# Patient Record
Sex: MALE | Race: WHITE | NOT HISPANIC OR LATINO | Employment: OTHER | URBAN - NONMETROPOLITAN AREA
[De-identification: names, ages, dates, MRNs, and addresses within clinical notes are randomized per-mention and may not be internally consistent; named-entity substitution may affect disease eponyms.]

---

## 2019-10-07 ENCOUNTER — TRANSCRIBE ORDERS (OUTPATIENT)
Dept: ADMINISTRATIVE | Facility: HOSPITAL | Age: 54
End: 2019-10-07

## 2019-10-07 DIAGNOSIS — C34.91 PRIMARY LUNG CANCER, RIGHT (HCC): ICD-10-CM

## 2019-10-07 DIAGNOSIS — R91.1 LUNG NODULE: Primary | ICD-10-CM

## 2019-10-07 DIAGNOSIS — J44.9 CHRONIC OBSTRUCTIVE PULMONARY DISEASE, UNSPECIFIED COPD TYPE (HCC): ICD-10-CM

## 2019-10-11 ENCOUNTER — HOSPITAL ENCOUNTER (OUTPATIENT)
Dept: PET IMAGING | Facility: HOSPITAL | Age: 54
Discharge: HOME | End: 2019-10-11

## 2019-10-11 DIAGNOSIS — R91.1 LUNG NODULE: ICD-10-CM

## 2019-10-17 ENCOUNTER — TRANSCRIBE ORDERS (OUTPATIENT)
Dept: ADMINISTRATIVE | Facility: HOSPITAL | Age: 54
End: 2019-10-17

## 2019-10-17 DIAGNOSIS — C34.11 MALIGNANT NEOPLASM OF UPPER LOBE OF RIGHT LUNG (HCC): Primary | ICD-10-CM

## 2019-10-18 ENCOUNTER — HOSPITAL ENCOUNTER (OUTPATIENT)
Dept: PET IMAGING | Facility: HOSPITAL | Age: 54
Discharge: HOME | End: 2019-10-18
Admitting: INTERNAL MEDICINE

## 2019-10-18 PROCEDURE — 78815 PET IMAGE W/CT SKULL-THIGH: CPT

## 2019-10-18 PROCEDURE — A9552 F18 FDG: HCPCS | Performed by: INTERNAL MEDICINE

## 2019-10-18 PROCEDURE — 78815 PET IMAGE W/CT SKULL-THIGH: CPT | Performed by: RADIOLOGY

## 2019-10-18 PROCEDURE — 0 FLUDEOXYGLUCOSE F18 SOLUTION: Performed by: INTERNAL MEDICINE

## 2019-10-18 RX ADMIN — FLUDEOXYGLUCOSE F18 1 DOSE: 300 INJECTION INTRAVENOUS at 10:46

## 2019-10-22 ENCOUNTER — APPOINTMENT (OUTPATIENT)
Dept: CT IMAGING | Facility: HOSPITAL | Age: 54
End: 2019-10-22

## 2022-05-02 ENCOUNTER — OFFICE VISIT (OUTPATIENT)
Dept: SURGERY | Facility: CLINIC | Age: 57
End: 2022-05-02

## 2022-05-02 VITALS
OXYGEN SATURATION: 98 % | BODY MASS INDEX: 26.07 KG/M2 | WEIGHT: 172 LBS | TEMPERATURE: 98.2 F | RESPIRATION RATE: 18 BRPM | DIASTOLIC BLOOD PRESSURE: 80 MMHG | HEART RATE: 82 BPM | SYSTOLIC BLOOD PRESSURE: 144 MMHG | HEIGHT: 68 IN

## 2022-05-02 DIAGNOSIS — K80.20 CALCULUS OF GALLBLADDER WITHOUT CHOLECYSTITIS WITHOUT OBSTRUCTION: Primary | ICD-10-CM

## 2022-05-02 PROCEDURE — 99204 OFFICE O/P NEW MOD 45 MIN: CPT | Performed by: SURGERY

## 2022-05-02 RX ORDER — CLONIDINE HYDROCHLORIDE 0.3 MG/1
0.3 TABLET ORAL EVERY 8 HOURS
COMMUNITY
Start: 2022-02-03

## 2022-05-02 RX ORDER — PAROXETINE HYDROCHLORIDE 20 MG/1
20 TABLET, FILM COATED ORAL EVERY MORNING
COMMUNITY
Start: 2022-04-02

## 2022-05-02 RX ORDER — AMLODIPINE BESYLATE 10 MG/1
10 TABLET ORAL DAILY
COMMUNITY

## 2022-05-02 RX ORDER — INSULIN LISPRO 100 [IU]/ML
INJECTION, SOLUTION INTRAVENOUS; SUBCUTANEOUS AS NEEDED
COMMUNITY
Start: 2022-02-03

## 2022-05-02 RX ORDER — LISINOPRIL 40 MG/1
40 TABLET ORAL DAILY
COMMUNITY

## 2022-05-02 RX ORDER — GABAPENTIN 800 MG/1
800 TABLET ORAL 3 TIMES DAILY
COMMUNITY

## 2022-05-02 RX ORDER — SODIUM CHLORIDE 9 MG/ML
100 INJECTION, SOLUTION INTRAVENOUS CONTINUOUS
Status: CANCELLED | OUTPATIENT
Start: 2022-05-02

## 2022-05-02 RX ORDER — MONTELUKAST SODIUM 10 MG/1
10 TABLET ORAL EVERY EVENING
COMMUNITY
Start: 2022-02-03

## 2022-05-02 RX ORDER — ARIPIPRAZOLE 5 MG/1
TABLET ORAL
COMMUNITY

## 2022-05-02 NOTE — PROGRESS NOTES
Patient: Du Mcknight    YOB: 1965    Date: 05/02/2022    Primary Care Provider: Isaac Addison MD    Chief Complaint   Patient presents with   • Cholelithiasis     Right upper quadrant pain       SUBJECTIVE:    History of present illness: Patient with a several month history of right upper quadrant pain.  This oftentimes occurs after eating but not necessarily.  Associated with nausea but no vomiting.  Mild to moderate in nature.    The following portions of the patient's history were reviewed and updated as appropriate: allergies, current medications, past family history, past medical history, past social history, past surgical history and problem list.    Review of Systems   Constitutional: Positive for appetite change. Negative for chills, fatigue, fever and unexpected weight change.   HENT: Negative for congestion, nosebleeds, postnasal drip, trouble swallowing and voice change.    Eyes: Negative for photophobia and visual disturbance.   Respiratory: Negative for apnea, cough, choking, chest tightness, shortness of breath and wheezing.    Cardiovascular: Negative for chest pain, palpitations and leg swelling.   Gastrointestinal: Positive for abdominal pain (right upper quadrant / epigastric pain) and nausea. Negative for abdominal distention, anal bleeding, blood in stool, constipation, diarrhea, rectal pain and vomiting.   Endocrine: Negative for cold intolerance and heat intolerance.   Genitourinary: Negative for difficulty urinating, dysuria, flank pain, frequency, scrotal swelling and testicular pain.   Musculoskeletal: Negative for arthralgias, back pain, gait problem and joint swelling.   Skin: Negative for color change, rash and wound.   Neurological: Negative for dizziness, seizures, syncope, speech difficulty, weakness, light-headedness, numbness and headaches.   Hematological: Negative for adenopathy. Does not bruise/bleed easily.   Psychiatric/Behavioral: Negative for  "agitation, behavioral problems, confusion and hallucinations. The patient is not nervous/anxious.        History:  Past Medical History:   Diagnosis Date   • Cancer (HCC)    • Diabetes mellitus (HCC)    • Hypertension        Past Surgical History:   Procedure Laterality Date   • BRAIN SURGERY N/A    • HIP SURGERY     • LUNG CANCER SURGERY         History reviewed. No pertinent family history.    Social History     Tobacco Use   • Smoking status: Current Every Day Smoker   • Smokeless tobacco: Never Used   Substance Use Topics   • Alcohol use: Defer   • Drug use: Defer       Allergies:  Allergies   Allergen Reactions   • Neosporin Original [Bacitracin-Neomycin-Polymyxin] Hives       Medications:    Current Outpatient Medications:   •  amLODIPine (NORVASC) 10 MG tablet, amlodipine 10 mg tablet, Disp: , Rfl:   •  ARIPiprazole (ABILIFY) 5 MG tablet, aripiprazole 5 mg tablet, Disp: , Rfl:   •  cloNIDine (CATAPRES) 0.3 MG tablet, Take 0.3 mg by mouth Every 8 (Eight) Hours., Disp: , Rfl:   •  gabapentin (NEURONTIN) 800 MG tablet, gabapentin 800 mg tablet, Disp: , Rfl:   •  HumaLOG KwikPen 100 UNIT/ML solution pen-injector, , Disp: , Rfl:   •  lisinopril (PRINIVIL,ZESTRIL) 40 MG tablet, lisinopril 40 mg tablet, Disp: , Rfl:   •  montelukast (SINGULAIR) 10 MG tablet, Take 10 mg by mouth Every Evening., Disp: , Rfl:   •  PARoxetine (PAXIL) 20 MG tablet, Take 20 mg by mouth Every Morning., Disp: , Rfl:     OBJECTIVE:    Vital Signs:   Vitals:    05/02/22 1315   BP: 144/80   Pulse: 82   Resp: 18   Temp: 98.2 °F (36.8 °C)   TempSrc: Temporal   SpO2: 98%   Weight: 78 kg (172 lb)   Height: 172.7 cm (68\")       Physical Exam:   General Appearance:    Alert, cooperative, in no acute distress   Head:    Normocephalic, without obvious abnormality, atraumatic   Eyes:            Normal.  No scleral icterus.  PERRLA    Lungs:     Clear to auscultation,respirations regular, even and                  unlabored    Heart:    Regular rhythm " and normal rate, normal S1 and S2, no            murmur   Abdomen:     Normal bowel sounds, no masses, no organomegaly, soft        non-tender, non-distended, no guarding,    Extremities:   Moves all extremities well, no edema, no cyanosis, no             redness   Skin:   No bleeding, bruising or rash   Neurologic:   Normal without gross deficits.   Psychiatric: No evidence of depression or anxiety        Results Review:   I reviewed the patient's new clinical results.  Labs and ultrasound and CT were reviewed    Review of Systems was reviewed and confirmed as accurate as documented by the MA.    ASSESSMENT/PLAN:    1. Calculus of gallbladder without cholecystitis without obstruction      Patient with probable symptomatic cholelithiasis.  I discussed this diagnosis with him.  I explained to him that he has both the option of observation with low-fat diet versus a cholecystectomy.  He understands the risks of observation which include but not limited to cholecystitis, choledocholithiasis, pancreatitis etc.   I also explained this procedure to them in detail and they understand the procedure.  They also understand the risks of bleeding, infection, bile leak, ductal injury, bowel injury, the possibility of converting to an open procedure etc. They understand all of these risks and I have answered all of their questions and they wish to proceed with a laparoscopic cholecystectomy.    Electronically signed by Eitan Webster MD  05/02/22

## 2022-05-04 PROBLEM — K80.20 CALCULUS OF GALLBLADDER WITHOUT CHOLECYSTITIS WITHOUT OBSTRUCTION: Status: ACTIVE | Noted: 2022-05-04

## 2022-05-12 ENCOUNTER — TELEPHONE (OUTPATIENT)
Dept: PREADMISSION TESTING | Facility: HOSPITAL | Age: 57
End: 2022-05-12

## 2022-05-12 NOTE — TELEPHONE ENCOUNTER
BRETM FOR PATIENT TO CALL AND CONFIRM  HIS PROCEDURE FOR 05/17/2022 @ [facility]..NOTED IN  VM THAT HE HAS PAT ON 05/13/2022 @ 11:00 AM W/R.

## 2022-05-13 ENCOUNTER — PRE-ADMISSION TESTING (OUTPATIENT)
Dept: PREADMISSION TESTING | Facility: HOSPITAL | Age: 57
End: 2022-05-13

## 2022-05-13 VITALS — WEIGHT: 171.8 LBS | BODY MASS INDEX: 26.04 KG/M2 | HEIGHT: 68 IN

## 2022-05-13 LAB
ANION GAP SERPL CALCULATED.3IONS-SCNC: 13.3 MMOL/L (ref 5–15)
BUN SERPL-MCNC: 28 MG/DL (ref 6–20)
BUN/CREAT SERPL: 13.9 (ref 7–25)
CALCIUM SPEC-SCNC: 10 MG/DL (ref 8.6–10.5)
CHLORIDE SERPL-SCNC: 100 MMOL/L (ref 98–107)
CO2 SERPL-SCNC: 23.7 MMOL/L (ref 22–29)
CREAT SERPL-MCNC: 2.01 MG/DL (ref 0.76–1.27)
DEPRECATED RDW RBC AUTO: 39.4 FL (ref 37–54)
EGFRCR SERPLBLD CKD-EPI 2021: 38.2 ML/MIN/1.73
ERYTHROCYTE [DISTWIDTH] IN BLOOD BY AUTOMATED COUNT: 11.6 % (ref 12.3–15.4)
GLUCOSE SERPL-MCNC: 182 MG/DL (ref 65–99)
HCT VFR BLD AUTO: 38.6 % (ref 37.5–51)
HGB BLD-MCNC: 13.4 G/DL (ref 13–17.7)
MCH RBC QN AUTO: 32.2 PG (ref 26.6–33)
MCHC RBC AUTO-ENTMCNC: 34.7 G/DL (ref 31.5–35.7)
MCV RBC AUTO: 92.8 FL (ref 79–97)
PLATELET # BLD AUTO: 285 10*3/MM3 (ref 140–450)
PMV BLD AUTO: 9.6 FL (ref 6–12)
POTASSIUM SERPL-SCNC: 5.2 MMOL/L (ref 3.5–5.2)
RBC # BLD AUTO: 4.16 10*6/MM3 (ref 4.14–5.8)
SARS-COV-2 RNA PNL SPEC NAA+PROBE: NOT DETECTED
SODIUM SERPL-SCNC: 137 MMOL/L (ref 136–145)
WBC NRBC COR # BLD: 10.22 10*3/MM3 (ref 3.4–10.8)

## 2022-05-13 PROCEDURE — 93010 ELECTROCARDIOGRAM REPORT: CPT | Performed by: INTERNAL MEDICINE

## 2022-05-13 PROCEDURE — 36415 COLL VENOUS BLD VENIPUNCTURE: CPT

## 2022-05-13 PROCEDURE — C9803 HOPD COVID-19 SPEC COLLECT: HCPCS

## 2022-05-13 PROCEDURE — 85027 COMPLETE CBC AUTOMATED: CPT

## 2022-05-13 PROCEDURE — 93005 ELECTROCARDIOGRAM TRACING: CPT

## 2022-05-13 PROCEDURE — U0005 INFEC AGEN DETEC AMPLI PROBE: HCPCS

## 2022-05-13 PROCEDURE — U0004 COV-19 TEST NON-CDC HGH THRU: HCPCS

## 2022-05-13 PROCEDURE — 80048 BASIC METABOLIC PNL TOTAL CA: CPT

## 2022-05-13 RX ORDER — TAMSULOSIN HYDROCHLORIDE 0.4 MG/1
1 CAPSULE ORAL DAILY
COMMUNITY

## 2022-05-13 RX ORDER — SEMAGLUTIDE 1.34 MG/ML
INJECTION, SOLUTION SUBCUTANEOUS WEEKLY
COMMUNITY

## 2022-05-13 NOTE — PAT
Patient here for PAT appointment today.  Preliminary EKG shows NSR, low voltage QRS, cannot rule out anterior infarct, age undetermined, T wave abnormality, consider inferior ischemia, abnormal ECG.  No history of CP and no history of seeing a cardiologist.  No previous EKG on file.  This information relayed to LUIS DANIEL De.  [place] states nothing further needed for patient.

## 2022-05-14 LAB
QT INTERVAL: 370 MS
QTC INTERVAL: 437 MS

## 2022-05-16 ENCOUNTER — TELEPHONE (OUTPATIENT)
Dept: SURGERY | Facility: CLINIC | Age: 57
End: 2022-05-16

## 2022-05-16 NOTE — TELEPHONE ENCOUNTER
Patient contacted regarding labs, advised that he is going to see a nephrologist next month. Stated that he was aware of the concern. All questions have been answered.

## 2022-05-16 NOTE — TELEPHONE ENCOUNTER
----- Message from Eitan Webster MD sent at 5/13/2022 12:50 PM EDT -----  Is he aware that he has some minor kidney issues.  Also seen about 3 months ago.  If not he needs to see his primary care for further evaluation for this.  ----- Message -----  From: Lab, Background User  Sent: 5/13/2022  12:23 PM EDT  To: Eitan Webster MD

## 2022-05-17 ENCOUNTER — ANESTHESIA (OUTPATIENT)
Dept: PERIOP | Facility: HOSPITAL | Age: 57
End: 2022-05-17

## 2022-05-17 ENCOUNTER — APPOINTMENT (OUTPATIENT)
Dept: GENERAL RADIOLOGY | Facility: HOSPITAL | Age: 57
End: 2022-05-17

## 2022-05-17 ENCOUNTER — ANESTHESIA EVENT (OUTPATIENT)
Dept: PERIOP | Facility: HOSPITAL | Age: 57
End: 2022-05-17

## 2022-05-17 ENCOUNTER — HOSPITAL ENCOUNTER (OUTPATIENT)
Facility: HOSPITAL | Age: 57
Setting detail: HOSPITAL OUTPATIENT SURGERY
Discharge: HOME | End: 2022-05-17
Attending: SURGERY | Admitting: SURGERY

## 2022-05-17 VITALS
DIASTOLIC BLOOD PRESSURE: 96 MMHG | OXYGEN SATURATION: 93 % | HEART RATE: 75 BPM | SYSTOLIC BLOOD PRESSURE: 162 MMHG | TEMPERATURE: 97.5 F | RESPIRATION RATE: 20 BRPM

## 2022-05-17 DIAGNOSIS — K80.20 CALCULUS OF GALLBLADDER WITHOUT CHOLECYSTITIS WITHOUT OBSTRUCTION: ICD-10-CM

## 2022-05-17 LAB — GLUCOSE BLDC GLUCOMTR-MCNC: 149 MG/DL (ref 70–130)

## 2022-05-17 PROCEDURE — 82962 GLUCOSE BLOOD TEST: CPT

## 2022-05-17 PROCEDURE — 0 AMPICILLIN-SULBACTAM PER 1.5 G

## 2022-05-17 PROCEDURE — 25010000002 DEXAMETHASONE PER 1 MG: Performed by: NURSE ANESTHETIST, CERTIFIED REGISTERED

## 2022-05-17 PROCEDURE — 25010000002 ONDANSETRON PER 1 MG: Performed by: NURSE ANESTHETIST, CERTIFIED REGISTERED

## 2022-05-17 PROCEDURE — 25010000002 FENTANYL CITRATE (PF) 100 MCG/2ML SOLUTION: Performed by: NURSE ANESTHETIST, CERTIFIED REGISTERED

## 2022-05-17 PROCEDURE — 25010000002 PROPOFOL 200 MG/20ML EMULSION: Performed by: NURSE ANESTHETIST, CERTIFIED REGISTERED

## 2022-05-17 PROCEDURE — 25010000002 HYDROMORPHONE 1 MG/ML SOLUTION: Performed by: NURSE ANESTHETIST, CERTIFIED REGISTERED

## 2022-05-17 PROCEDURE — 47563 LAPARO CHOLECYSTECTOMY/GRAPH: CPT | Performed by: SURGERY

## 2022-05-17 PROCEDURE — 88304 TISSUE EXAM BY PATHOLOGIST: CPT

## 2022-05-17 PROCEDURE — 25010000002 IOPAMIDOL 61 % SOLUTION: Performed by: SURGERY

## 2022-05-17 PROCEDURE — 74300 X-RAY BILE DUCTS/PANCREAS: CPT

## 2022-05-17 DEVICE — LIGAMAX 5 MM ENDOSCOPIC MULTIPLE CLIP APPLIER
Type: IMPLANTABLE DEVICE | Site: ABDOMEN | Status: FUNCTIONAL
Brand: LIGAMAX

## 2022-05-17 RX ORDER — SODIUM CHLORIDE 9 MG/ML
100 INJECTION, SOLUTION INTRAVENOUS CONTINUOUS
Status: DISCONTINUED | OUTPATIENT
Start: 2022-05-17 | End: 2022-05-17 | Stop reason: HOSPADM

## 2022-05-17 RX ORDER — IPRATROPIUM BROMIDE AND ALBUTEROL SULFATE 2.5; .5 MG/3ML; MG/3ML
3 SOLUTION RESPIRATORY (INHALATION) ONCE AS NEEDED
Status: DISCONTINUED | OUTPATIENT
Start: 2022-05-17 | End: 2022-05-17 | Stop reason: HOSPADM

## 2022-05-17 RX ORDER — LABETALOL HYDROCHLORIDE 5 MG/ML
10 INJECTION, SOLUTION INTRAVENOUS ONCE
Status: COMPLETED | OUTPATIENT
Start: 2022-05-17 | End: 2022-05-17

## 2022-05-17 RX ORDER — ROCURONIUM BROMIDE 10 MG/ML
INJECTION, SOLUTION INTRAVENOUS AS NEEDED
Status: DISCONTINUED | OUTPATIENT
Start: 2022-05-17 | End: 2022-05-17 | Stop reason: SURG

## 2022-05-17 RX ORDER — ONDANSETRON 4 MG/1
4 TABLET, FILM COATED ORAL EVERY 8 HOURS PRN
Qty: 8 TABLET | Refills: 1 | Status: SHIPPED | OUTPATIENT
Start: 2022-05-17

## 2022-05-17 RX ORDER — HYDROCODONE BITARTRATE AND ACETAMINOPHEN 5; 325 MG/1; MG/1
1-2 TABLET ORAL EVERY 4 HOURS PRN
Qty: 10 TABLET | Refills: 0 | Status: SHIPPED | OUTPATIENT
Start: 2022-05-17 | End: 2022-05-17 | Stop reason: SDUPTHER

## 2022-05-17 RX ORDER — ONDANSETRON 2 MG/ML
INJECTION INTRAMUSCULAR; INTRAVENOUS AS NEEDED
Status: DISCONTINUED | OUTPATIENT
Start: 2022-05-17 | End: 2022-05-17 | Stop reason: SURG

## 2022-05-17 RX ORDER — HYDROCODONE BITARTRATE AND ACETAMINOPHEN 5; 325 MG/1; MG/1
1-2 TABLET ORAL EVERY 4 HOURS PRN
Qty: 10 TABLET | Refills: 0 | Status: SHIPPED | OUTPATIENT
Start: 2022-05-17 | End: 2022-05-17 | Stop reason: HOSPADM

## 2022-05-17 RX ORDER — DEXTROSE MONOHYDRATE 25 G/50ML
25 INJECTION, SOLUTION INTRAVENOUS ONCE
Status: CANCELLED | OUTPATIENT
Start: 2022-05-17

## 2022-05-17 RX ORDER — FENTANYL CITRATE 50 UG/ML
INJECTION, SOLUTION INTRAMUSCULAR; INTRAVENOUS AS NEEDED
Status: DISCONTINUED | OUTPATIENT
Start: 2022-05-17 | End: 2022-05-17 | Stop reason: SURG

## 2022-05-17 RX ORDER — PROPOFOL 10 MG/ML
INJECTION, EMULSION INTRAVENOUS AS NEEDED
Status: DISCONTINUED | OUTPATIENT
Start: 2022-05-17 | End: 2022-05-17 | Stop reason: SURG

## 2022-05-17 RX ORDER — LIDOCAINE HYDROCHLORIDE 20 MG/ML
INJECTION, SOLUTION INTRAVENOUS AS NEEDED
Status: DISCONTINUED | OUTPATIENT
Start: 2022-05-17 | End: 2022-05-17 | Stop reason: SURG

## 2022-05-17 RX ORDER — BUPIVACAINE HYDROCHLORIDE AND EPINEPHRINE 2.5; 5 MG/ML; UG/ML
INJECTION, SOLUTION EPIDURAL; INFILTRATION; INTRACAUDAL; PERINEURAL AS NEEDED
Status: DISCONTINUED | OUTPATIENT
Start: 2022-05-17 | End: 2022-05-17 | Stop reason: HOSPADM

## 2022-05-17 RX ORDER — DEXAMETHASONE SODIUM PHOSPHATE 4 MG/ML
INJECTION, SOLUTION INTRA-ARTICULAR; INTRALESIONAL; INTRAMUSCULAR; INTRAVENOUS; SOFT TISSUE AS NEEDED
Status: DISCONTINUED | OUTPATIENT
Start: 2022-05-17 | End: 2022-05-17 | Stop reason: SURG

## 2022-05-17 RX ORDER — PROCHLORPERAZINE EDISYLATE 5 MG/ML
10 INJECTION INTRAMUSCULAR; INTRAVENOUS ONCE AS NEEDED
Status: DISCONTINUED | OUTPATIENT
Start: 2022-05-17 | End: 2022-05-17 | Stop reason: HOSPADM

## 2022-05-17 RX ORDER — IBUPROFEN 800 MG/1
800 TABLET ORAL EVERY 6 HOURS PRN
Qty: 12 TABLET | Refills: 0 | Status: SHIPPED | OUTPATIENT
Start: 2022-05-17 | End: 2023-05-17

## 2022-05-17 RX ORDER — ONDANSETRON 2 MG/ML
4 INJECTION INTRAMUSCULAR; INTRAVENOUS ONCE AS NEEDED
Status: DISCONTINUED | OUTPATIENT
Start: 2022-05-17 | End: 2022-05-17 | Stop reason: HOSPADM

## 2022-05-17 RX ADMIN — SODIUM CHLORIDE: 9 INJECTION, SOLUTION INTRAVENOUS at 15:12

## 2022-05-17 RX ADMIN — ROCURONIUM BROMIDE 30 MG: 10 INJECTION INTRAVENOUS at 14:30

## 2022-05-17 RX ADMIN — HYDROMORPHONE HYDROCHLORIDE 0.5 MG: 1 INJECTION, SOLUTION INTRAMUSCULAR; INTRAVENOUS; SUBCUTANEOUS at 15:33

## 2022-05-17 RX ADMIN — LABETALOL 20 MG/4 ML (5 MG/ML) INTRAVENOUS SYRINGE 10 MG: at 15:43

## 2022-05-17 RX ADMIN — Medication 3 G: at 14:34

## 2022-05-17 RX ADMIN — FENTANYL CITRATE 50 MCG: 50 INJECTION INTRAMUSCULAR; INTRAVENOUS at 14:28

## 2022-05-17 RX ADMIN — SODIUM CHLORIDE 100 ML/HR: 9 INJECTION, SOLUTION INTRAVENOUS at 13:47

## 2022-05-17 RX ADMIN — ROCURONIUM BROMIDE 10 MG: 10 INJECTION INTRAVENOUS at 14:54

## 2022-05-17 RX ADMIN — HYDROMORPHONE HYDROCHLORIDE 0.5 MG: 1 INJECTION, SOLUTION INTRAMUSCULAR; INTRAVENOUS; SUBCUTANEOUS at 15:50

## 2022-05-17 RX ADMIN — SUGAMMADEX 200 MG: 100 INJECTION, SOLUTION INTRAVENOUS at 15:15

## 2022-05-17 RX ADMIN — LIDOCAINE HYDROCHLORIDE 60 MG: 20 INJECTION, SOLUTION INTRAVENOUS at 14:30

## 2022-05-17 RX ADMIN — PROPOFOL 200 MG: 10 INJECTION, EMULSION INTRAVENOUS at 14:30

## 2022-05-17 RX ADMIN — ONDANSETRON 4 MG: 2 INJECTION INTRAMUSCULAR; INTRAVENOUS at 14:38

## 2022-05-17 RX ADMIN — DEXAMETHASONE SODIUM PHOSPHATE 4 MG: 4 INJECTION, SOLUTION INTRAMUSCULAR; INTRAVENOUS at 14:38

## 2022-05-17 NOTE — ANESTHESIA PROCEDURE NOTES
Airway  Urgency: elective    Date/Time: 5/17/2022 2:31 PM  Airway not difficult    General Information and Staff    Patient location during procedure: OR  CRNA/CAA: Vielka Henning CRNA    Indications and Patient Condition  Indications for airway management: airway protection    Preoxygenated: yes  MILS not maintained throughout  Mask difficulty assessment: 1 - vent by mask    Final Airway Details  Final airway type: endotracheal airway      Successful airway: ETT  Cuffed: yes   Successful intubation technique: direct laryngoscopy  Facilitating devices/methods: intubating stylet  Endotracheal tube insertion site: oral  Blade: Melody  Blade size: 3  ETT size (mm): 7.0  Cormack-Lehane Classification: grade I - full view of glottis  Placement verified by: chest auscultation and capnometry   Cuff volume (mL): 6  Measured from: lips  ETT/EBT  to lips (cm): 20  Number of attempts at approach: 1  Assessment: lips, teeth, and gum same as pre-op and atraumatic intubation    Additional Comments  Negative epigastric sounds, Breath sound equal bilaterally with symmetric chest rise and fall

## 2022-05-17 NOTE — ANESTHESIA PREPROCEDURE EVALUATION
Anesthesia Evaluation     Patient summary reviewed and Nursing notes reviewed   no history of anesthetic complications:  NPO Solid Status: > 8 hours  NPO Liquid Status: > 8 hours           Airway   Mallampati: II  TM distance: >3 FB  Neck ROM: full  Possible difficult intubation  Dental    (+) edentulous    Pulmonary - normal exam    breath sounds clear to auscultation  (+) a smoker Current Smoked day of surgery, COPD mild, shortness of breath,   Cardiovascular - normal exam  Exercise tolerance: good (4-7 METS)    ECG reviewed  Rhythm: regular  Rate: normal    (+) hypertension 2 medications or greater, valvular problems/murmurs murmur, hyperlipidemia,       Neuro/Psych  (+) psychiatric history Depression and Anxiety,    GI/Hepatic/Renal/Endo    (+)  GERD well controlled,  diabetes mellitus type 1 using insulin,     Musculoskeletal (-) negative ROS    Abdominal  - normal exam    Abdomen: soft.  Bowel sounds: normal.   Substance History - negative use     OB/GYN          Other      history of cancer remission    ROS/Med Hx Other:   Normal sinus rhythm  Low voltage QRS  Cannot rule out Anterior infarct (cited on or before 13-MAY-2022)  T wave abnormality, consider inferior ischemia  Abnormal ECG  When compared with ECG of 13-MAY-2022 11:26, (Unconfirmed)  No significant change was found  Confirmed by AMADA THOMAS ([id number]) on 5/14/2022 9:19:28 AM                        Anesthesia Plan    ASA 3     general   (Risks and benefits discussed including risk of aspiration, recall and dental damage. All patient questions answered.    Will continue with plan of care.)  intravenous induction     Anesthetic plan, all risks, benefits, and alternatives have been provided, discussed and informed consent has been obtained with: patient.  Use of blood products discussed with patient .       CODE STATUS:

## 2022-05-17 NOTE — DISCHARGE INSTRUCTIONS
Rest today.  No pushing, pulling, tugging,  heavy lifting, or strenuous activity.  No major decision making, driving, or drinking alcoholic beverages for 24 hours. ( due to the medications you have  received)  Always use good hand hygiene/washing techniques.  NO driving while taking pain medications.    * if you have an incision:  Check your incision area every day for signs of infection.   Check for:  * more redness, swelling, or pain  *more fluid or blood  *warmth  *pus or bad smell     To assist you in voiding:  Drink plenty of fluids  Listen to running water while attempting to void.    If you are unable to urinate and you have an uncomfortable urge to void or it has been   6 hours since you were discharged, return to the Emergency Room

## 2022-05-17 NOTE — ANESTHESIA POSTPROCEDURE EVALUATION
Patient: Du Mcknight    Procedure Summary     Date: 05/17/22 Room / Location: [facility] OR 1 /  [facility]    Anesthesia Start: 1423 Anesthesia Stop:     Procedure: CHOLECYSTECTOMY LAPAROSCOPIC INTRAOPERATIVE CHOLANGIOGRAPHY (N/A Abdomen) Diagnosis:       Calculus of gallbladder without cholecystitis without obstruction      (Calculus of gallbladder without cholecystitis without obstruction [K80.20])    Surgeons: Eitan Webster MD Provider: Billy Concepcion CRNA    Anesthesia Type: general ASA Status: 3          Anesthesia Type: general    Vitals  HR 91  Sat 100  Resp 20  Temp 98.8  /95        Post Anesthesia Care and Evaluation    Patient location during evaluation: PACU  Patient participation: complete - patient participated  Level of consciousness: awake and alert and sleepy but conscious  Pain score: 1  Pain management: satisfactory to patient  Airway patency: patent  Anesthetic complications: No anesthetic complications    Cardiovascular status: acceptable and stable  Respiratory status: acceptable and face mask  Hydration status: acceptable

## 2022-05-17 NOTE — OP NOTE
PATIENT:     Du Mcknight    DATE OF SURGERY:     5/17/2022    PHYSICIAN:   Eitan Webster MD    REFERRING PHYSICIAN:  Isaac Addison MD     YOB: 1965    PREOPERATIVE DIAGNOSIS:  Chronic cholecystitis due to Cholelithiasis    POSTOPERATIVE DIAGNOSIS:  Chronic cholecystitis due to Cholelithiasis    PROCEDURE:  Laparoscopic cholecystectomy with intraoperative cholangiography.    ANESTHESIA:  General endotracheal.    EBL: Less than 30 mL    Specimen: Gallbladder    Complications: None    OPERATIVE PROCEDURE:  The patient was taken to the operating room, placed in the supine position, and given general endotracheal anesthesia.  The patient was prepped and draped in the normal sterile fashion, and also received preoperative IV antibiotics.  An appropriate timeout was performed by the nursing staff prior to the incision.     Local anesthetic with Marcaine 0.5% was infused locally at all incision sites. An infraumbilical incision was then made to insert a 5 mm Optiview into the peritoneal cavity and the peritoneal cavity was insufflated with carbon dioxide. A separate 5 mm port was inserted in the subxiphoid region  along with a right subcostal 5 mm port.  The gallbladder was well visualized.    Good exposure was obtained, and the gallbladder was grasped at its infundibulum and its fundus and retracted superiorly and laterally.  There were some chronic attachments of fatty tissue to the gallbladder indicative of chronic cholecystitis and these were easily taken down.    Good exposure was obtained and dissection was performed at the junction of the cystic duct and the gallbladder. The cystic duct and cystic artery were identified in the normal manner.  A clip was then placed on the cystic duct proximally and then two clips were placed on the cystic artery proximally and one distally.  Cystic ductotomy was performed.  A separate cholangiogram catheter had been inserted through a right upper  quadrant introducer port and then this was fed into the cystic duct itself and a clip was applied.     Intraoperative cholangiography was then performed under fluoroscopy, carefully evaluating the biliary ductal anatomy.  This was done without difficulty.  No anatomic abnormality was noted.      The cholangiogram catheter was removed.  The cystic duct was clipped twice distally and then divided, as was the cystic artery.  Bovie electrocautery with L-hook was then used to remove the gallbladder from the liver bed.  It was then removed through the umbilical port site using an Endo Catch bag. Fascia was closed with a 2-O Vicryl figure of eight suture.    Copious irrigation was used in the patient’s abdominal cavity.  It was clean and dry at this point.  Hemostasis had been well controlled.  Hemostasis was intact and there was no evidence of bilious leakage. Trocars were removed under direct vision and the skin was closed with 4-0 PDS subcuticular stitch along with Steri-Strips for skin reapproximation.  There were no complications.    The patient was stable at this point and subsequently transferred back to the recovery room in stable condition.    Eitan Webster MD    5/17/2022    15:21 EDT

## 2022-05-19 LAB — REF LAB TEST METHOD: NORMAL

## 2022-05-23 ENCOUNTER — OFFICE VISIT (OUTPATIENT)
Dept: SURGERY | Facility: CLINIC | Age: 57
End: 2022-05-23

## 2022-05-23 VITALS
HEIGHT: 68 IN | DIASTOLIC BLOOD PRESSURE: 85 MMHG | HEART RATE: 75 BPM | BODY MASS INDEX: 26.1 KG/M2 | RESPIRATION RATE: 16 BRPM | WEIGHT: 172.2 LBS | OXYGEN SATURATION: 98 % | SYSTOLIC BLOOD PRESSURE: 139 MMHG | TEMPERATURE: 96.9 F

## 2022-05-23 DIAGNOSIS — Z48.89 POSTOPERATIVE VISIT: Primary | ICD-10-CM

## 2022-05-23 PROCEDURE — 99024 POSTOP FOLLOW-UP VISIT: CPT | Performed by: SURGERY

## 2022-05-23 RX ORDER — FLUTICASONE PROPIONATE 50 MCG
1 SPRAY, SUSPENSION (ML) NASAL DAILY
COMMUNITY
Start: 2021-08-22 | End: 2022-08-22

## 2022-05-23 RX ORDER — PANTOPRAZOLE SODIUM 40 MG/1
40 TABLET, DELAYED RELEASE ORAL
COMMUNITY

## 2022-05-23 RX ORDER — CETIRIZINE HYDROCHLORIDE 10 MG/1
TABLET ORAL
COMMUNITY

## 2022-05-23 RX ORDER — ATORVASTATIN CALCIUM 40 MG/1
40 TABLET, FILM COATED ORAL DAILY
COMMUNITY

## 2022-05-23 NOTE — PROGRESS NOTES
"Patient: Du Mcknight    YOB: 1965    Date: 05/23/2022    Primary Care Provider: Isaac Addison MD    Chief Complaint   Patient presents with   • Post-op     Lap priyanka       History of present illness:  I saw the patient in the office today as a followup from their recent laparoscopic cholecystectomy. They state that they have done well and are having no complaints.    Vital Signs:   Vitals:    05/23/22 1404   BP: 139/85   Pulse: 75   Resp: 16   Temp: 96.9 °F (36.1 °C)   TempSrc: Temporal   SpO2: 98%   Weight: 78.1 kg (172 lb 3.2 oz)   Height: 172.7 cm (68\")       Physical Exam:   General Appearance:    Alert, cooperative, in no acute distress   Abdomen:     no masses, no organomegaly, soft non-tender, non-distended, no guarding, wounds are well healed     Assessment / Plan :    1. Postoperative visit        Patient doing well postoperatively.  Having no issues.  Feeling well.  I gave him dietary and activity instructions.  I will have him follow-up with me as needed.    Electronically signed by Natalie Marin MA  05/23/22                  "

## 2022-05-25 NOTE — PROGRESS NOTES
There is questionable mild narrowing of the biliary tree as per the radiologist report.  These are subtle findings.  Patient is not having any issues.  LFTs are normal.  I reviewed this with GI and they suggest no further work-up given these findings especially since he is asymptomatic and normal LFTs as there would be very low likelihood of an abnormality of significance.

## 2022-05-27 ENCOUNTER — TELEPHONE (OUTPATIENT)
Dept: SURGERY | Facility: CLINIC | Age: 57
End: 2022-05-27

## 2022-05-27 NOTE — TELEPHONE ENCOUNTER
Patient contacted discussed results below, per dr. Webster. Patient verbalized understanding.All questions were answered. He was advised to call our office should he have any other concerns. Patient verbalized understanding.

## 2022-05-27 NOTE — TELEPHONE ENCOUNTER
----- Message from Eitan Webster MD sent at 5/25/2022  8:24 AM EDT -----  There is questionable mild narrowing of the biliary tree as per the radiologist report.  These are subtle findings.  Patient is not having any issues.  LFTs are normal.  I reviewed this with GI and they suggest no further work-up given these findings especially since he is asymptomatic and normal LFTs as there would be very low likelihood of an abnormality of significance.

## (undated) DEVICE — ENDOPATH XCEL BLADELESS TROCARS WITH STABILITY SLEEVES: Brand: ENDOPATH XCEL

## (undated) DEVICE — SOL NACL 0.9PCT 1000ML

## (undated) DEVICE — ENDOPOUCH RETRIEVER SPECIMEN RETRIEVAL BAGS: Brand: ENDOPOUCH RETRIEVER

## (undated) DEVICE — CLAVICLE STRAP: Brand: DEROYAL

## (undated) DEVICE — 2, DISPOSABLE SUCTION/IRRIGATOR WITHOUT DISPOSABLE TIP: Brand: STRYKEFLOW

## (undated) DEVICE — SLV SCD CALF HEMOFORCE DVT THERP REPROC MD

## (undated) DEVICE — FLTR PLUMEPORT LAP W/CONN STRL

## (undated) DEVICE — SOL IRR NACL 0.9PCT BT 1000ML

## (undated) DEVICE — ENDOPATH XCEL UNIVERSAL TROCAR STABLILITY SLEEVES: Brand: ENDOPATH XCEL

## (undated) DEVICE — MONOPOLAR METZENBAUM SCISSOR, MINI BLADE TIP, DISPOSABLE: Brand: MONOPOLAR METZENBAUM SCISSOR, MINI BLADE TIP, DISPOSABLE

## (undated) DEVICE — GLV SURG SENSICARE W/ALOE PF LF 7.5 STRL

## (undated) DEVICE — KT CATH CHOLANGIOGRA PERC W/BALLO

## (undated) DEVICE — DISPOSABLE MONOPOLAR ENDOSCOPIC CORD 10 FT. (3M): Brand: KIRWAN

## (undated) DEVICE — TP ELECTRD LAP L WR SPLIT33CM

## (undated) DEVICE — UNDYED MONOFILAMENT (POLYDIOXANONE), ABSORBABLE SYNTHETIC SURGICAL SUTURE: Brand: PDS

## (undated) DEVICE — RICH GENERAL LAPAROSCOPY: Brand: MEDLINE INDUSTRIES, INC.